# Patient Record
(demographics unavailable — no encounter records)

---

## 2025-03-12 NOTE — ASSESSMENT
[FreeTextEntry1] : # HS, severe, Hollis stage 3 with extensive scarring  #Suppurative acne on face vs #facial HS with extensive scarring, per pt worsened on cosentyx #high risk med mgmt Chronic, severe, flaring - Photos taken today for chart - has failed many prior medications as above in hx,including humira, cosentyx, isotretinoin, doxy/blake, prednisone - has had multiple HS surgeries in groin buttocks, axilla (most recently 2025) with good repsonse - Favor strating high dose infliximab (10 mg/kg week 0,2 and 6 followed by q 4 wks as maint) + upadicitinib. Would also consider cyclosporine for more acute control. Defers po pred today.  - May need genetic testing to r/o IL-1 mediated process (i.e. PASH), would also consider colonoscopy and/or skin biopsy to r/o CD in light of worsening on il-17i - check cytokine panel today - cbc, cmp, QG, hep serologies, lipid panel, g6PD (for dapsone) checked today  - r/b/a anti-TNF therapy discussed including but not limited to immunosuppression with increased risk of infection, decreased cell counts, elevated liver enzymes, worsening CHF and rash. - r/b/a MICHAEL inhibitors reviewed, discussed SE including but not limited to immunosuppression and increased risk of infection, malignancy, cytopenia, LFT elevation, lipid elevation, thrombotic events and CV events.    >45 min spent  RTC 1 week to potentially start rinvoq pending labs above

## 2025-03-12 NOTE — HISTORY OF PRESENT ILLNESS
[FreeTextEntry1] : f/u [de-identified] : Varinder Grant is a 39yoM presenting for mgmt of HS and acne  HiS HS in the groin and buttocks is quiet, he continues to drain in axilla, and the face and back are most problematic.  Acne and facial scarring has profoundly affected in mood and led to depression. He says because of it he became an alcoholic, although recently has been sober for 30 days and credits this to taking xanax twice a day. Has severe pain and persistent drainage. The only thing that has helped his HS has been surgery, and he has had multiple surgeries in the groin and buttcoks, and more recently in the last month in the right axilla.   Last saw Dr. Jacobs 9/2022. At that time noted to have Hollis stage 3 HS in axilla and groin as well as severe inflammatory acne on face with sinus tracts. Since then was started on cosentyx by outside derm and says acne on face "exploded." Recently on isotretinoin x 2 mos which did not help and stopped 2/2 daily alcohol intake.   Failed treatments: Doxy Isotretinoin, multiple courses most recently 10/2024-12/2024 Humira 2019/2020 8-9 mos Cosentyx ~Jan-June 2024 Oral and intralesional steroids  HISTORY:   - Has had bumps on body for 5-7 years, has had ?cystic facial acne since childhood, but noted marked worsening after cosnetyx - First saw a dermatologist 15y ago for facial acne--has been on doxycycline, isotretinoin, no response - prev w/ low platelets--had to be transfused - Denies any ulcers, joint pains - States he gets oxycodone, gabapentin from PCP to help him sleep

## 2025-03-12 NOTE — PHYSICAL EXAM
[Alert] : alert [Well Nourished] : well nourished [Conjunctiva Non-injected] : conjunctiva non-injected [FreeTextEntry3] : Cysts, nodules, and interconnected sinus tracts on face, axillae, groin, gluteal cleft Hypergranulation tissue, intergluteal cleft Purulent drainage of bilateral axillae, face Extensive scarring of bilateral axillae and groin and face Back and upper chest with diffusely scattered inflammatory papules/nodules/cysts and hyperpigmented macules/patches

## 2025-03-12 NOTE — HISTORY OF PRESENT ILLNESS
[FreeTextEntry1] : f/u [de-identified] : Varinder Grant is a 39yoM presenting for mgmt of HS and acne  HiS HS in the groin and buttocks is quiet, he continues to drain in axilla, and the face and back are most problematic.  Acne and facial scarring has profoundly affected in mood and led to depression. He says because of it he became an alcoholic, although recently has been sober for 30 days and credits this to taking xanax twice a day. Has severe pain and persistent drainage. The only thing that has helped his HS has been surgery, and he has had multiple surgeries in the groin and buttcoks, and more recently in the last month in the right axilla.   Last saw Dr. Jacobs 9/2022. At that time noted to have Hollis stage 3 HS in axilla and groin as well as severe inflammatory acne on face with sinus tracts. Since then was started on cosentyx by outside derm and says acne on face "exploded." Recently on isotretinoin x 2 mos which did not help and stopped 2/2 daily alcohol intake.   Failed treatments: Doxy Isotretinoin, multiple courses most recently 10/2024-12/2024 Humira 2019/2020 8-9 mos Cosentyx ~Jan-June 2024 Oral and intralesional steroids  HISTORY:   - Has had bumps on body for 5-7 years, has had ?cystic facial acne since childhood, but noted marked worsening after cosnetyx - First saw a dermatologist 15y ago for facial acne--has been on doxycycline, isotretinoin, no response - prev w/ low platelets--had to be transfused - Denies any ulcers, joint pains - States he gets oxycodone, gabapentin from PCP to help him sleep

## 2025-03-13 NOTE — HISTORY OF PRESENT ILLNESS
[FreeTextEntry1] : f/u [de-identified] : Varinder Grant is a 39yoM presenting for mgmt of HS and acne  HiS HS in the groin and buttocks is quiet, he continues to drain in axilla, and the face and back are most problematic.  Acne and facial scarring has profoundly affected in mood and led to depression. He says because of it he became an alcoholic, although recently has been sober for 30 days and credits this to taking xanax twice a day. Has severe pain and persistent drainage. The only thing that has helped his HS has been surgery, and he has had multiple surgeries in the groin and buttcoks, and more recently in the last month in the right axilla.   LV hx 3/5/25: Last saw Dr. Jacobs 9/2022. At that time noted to have Hollis stage 3 HS in axilla and groin as well as severe inflammatory acne on face with sinus tracts. Since then was started on cosentyx by outside derm and says acne on face "exploded." Recently on isotretinoin x 2 mos which did not help and stopped 2/2 daily alcohol intake.   Failed treatments: Doxy Isotretinoin, multiple courses most recently 10/2024-12/2024 Humira 2019/2020 8-9 mos Cosentyx ~Jan-June 2024 Oral and intralesional steroids  HISTORY:   - Has had bumps on body for 5-7 years, has had ?cystic facial acne since childhood, but noted marked worsening after cosnetyx - First saw a dermatologist 15y ago for facial acne--has been on doxycycline, isotretinoin, no response - prev w/ low platelets--had to be transfused - Denies any ulcers, joint pains - States he gets oxycodone, gabapentin from PCP to help him sleep

## 2025-03-13 NOTE — HISTORY OF PRESENT ILLNESS
[FreeTextEntry1] : f/u [de-identified] : Varinder Grant is a 39yoM presenting for mgmt of HS and acne  HiS HS in the groin and buttocks is quiet, he continues to drain in axilla, and the face and back are most problematic.  Acne and facial scarring has profoundly affected in mood and led to depression. He says because of it he became an alcoholic, although recently has been sober for 30 days and credits this to taking xanax twice a day. Has severe pain and persistent drainage. The only thing that has helped his HS has been surgery, and he has had multiple surgeries in the groin and buttcoks, and more recently in the last month in the right axilla.   LV hx 3/5/25: Last saw Dr. Jacobs 9/2022. At that time noted to have Hollis stage 3 HS in axilla and groin as well as severe inflammatory acne on face with sinus tracts. Since then was started on cosentyx by outside derm and says acne on face "exploded." Recently on isotretinoin x 2 mos which did not help and stopped 2/2 daily alcohol intake.   Failed treatments: Doxy Isotretinoin, multiple courses most recently 10/2024-12/2024 Humira 2019/2020 8-9 mos Cosentyx ~Jan-June 2024 Oral and intralesional steroids  HISTORY:   - Has had bumps on body for 5-7 years, has had ?cystic facial acne since childhood, but noted marked worsening after cosnetyx - First saw a dermatologist 15y ago for facial acne--has been on doxycycline, isotretinoin, no response - prev w/ low platelets--had to be transfused - Denies any ulcers, joint pains - States he gets oxycodone, gabapentin from PCP to help him sleep

## 2025-03-13 NOTE — ASSESSMENT
[FreeTextEntry1] : # HS, severe, Hollis stage 3 with extensive scarring  # Suppurative acne on face vs #facial HS with extensive scarring, per pt worsened on cosentyx # high risk med mgmt Chronic, severe, flaring - Photos taken 3/5/25 for chart - has failed many prior medications as above in hx, including humira, cosentyx, isotretinoin, doxy/blake, prednisone - has had multiple HS surgeries in groin buttocks, axilla (most recently 2025) with good repsonse - Favor starting high dose infliximab (10 mg/kg week 0,2 and 6 followed by q 4 wks as maint) + upadicitinib (samples of 15mg PO daily given to start today) SED. Defers po pred today.  - will order genetic testing to r/o IL-1 mediated process (i.e. PASH) - skin biopsy today to r/o CD in light of worsening on il-17i - check cytokine panel at - TNF- alpha WNL. IL-10, IL-6, IL-8, Interferon gamma are all elevated.  - reviewed labs from :  cbc with WBC 18.7, Hgb 12.9, . cmp with only mild elevation in glucose (106) total protein (8.9) and ALT slightly decreased (8) otherwise WNL.  QG negative. hep serologies - negative. lipid panel with mild high TG, LDL and Non-HDL and low HDL. total cholesterol is normal. g6PD (for dapsone) elevated at 25.7 - r/b/a anti-TNF therapy discussed including but not limited to immunosuppression with increased risk of infection, decreased cell counts, elevated liver enzymes, worsening CHF and rash. - r/b/a MICHAEL inhibitors reviewed, discussed SE including but not limited to immunosuppression and increased risk of infection, malignancy, cytopenia, LFT elevation, lipid elevation, thrombotic events and CV events.  - pt found hibiclens and BPO wash too drying, provided samples of CLn wash and dove sensitive skin today.   Neoplasm of uncertain behavior of the skin Location: chest DDx: r/o HS vs CD   Punch biopsy performed today over above location, risks and benefits discussed including incomplete removal, not enough tissue for diagnosis scarring and infection, informed consent obtained, pictures taken, cleaned with alcohol and anesthetized with 1%lido+epi,1cc total, lesion was biopsied with a 3mm punch, hemostasis obtained with nonabsorbable suture, vaseline and bandaid placed, tolerated well, wound care reviewed. I personally confirmed that the specimen was placed in the labeled vial, and I verbally confirmed this with patient in the room. The vial was closed and specimen sent to pathology.  >45 min spent  RTC 1 week after starting rinvoq today for fu and SR

## 2025-03-13 NOTE — HISTORY OF PRESENT ILLNESS
[FreeTextEntry1] : f/u [de-identified] : Varinder Grant is a 39yoM presenting for mgmt of HS and acne  HiS HS in the groin and buttocks is quiet, he continues to drain in axilla, and the face and back are most problematic.  Acne and facial scarring has profoundly affected in mood and led to depression. He says because of it he became an alcoholic, although recently has been sober for 30 days and credits this to taking xanax twice a day. Has severe pain and persistent drainage. The only thing that has helped his HS has been surgery, and he has had multiple surgeries in the groin and buttcoks, and more recently in the last month in the right axilla.   LV hx 3/5/25: Last saw Dr. Jacobs 9/2022. At that time noted to have Hollis stage 3 HS in axilla and groin as well as severe inflammatory acne on face with sinus tracts. Since then was started on cosentyx by outside derm and says acne on face "exploded." Recently on isotretinoin x 2 mos which did not help and stopped 2/2 daily alcohol intake.   Failed treatments: Doxy Isotretinoin, multiple courses most recently 10/2024-12/2024 Humira 2019/2020 8-9 mos Cosentyx ~Jan-June 2024 Oral and intralesional steroids  HISTORY:   - Has had bumps on body for 5-7 years, has had ?cystic facial acne since childhood, but noted marked worsening after cosnetyx - First saw a dermatologist 15y ago for facial acne--has been on doxycycline, isotretinoin, no response - prev w/ low platelets--had to be transfused - Denies any ulcers, joint pains - States he gets oxycodone, gabapentin from PCP to help him sleep

## 2025-03-23 NOTE — HISTORY OF PRESENT ILLNESS
[FreeTextEntry1] : f/u [de-identified] : Varinder Grant is a 39yoM presenting for mgmt of HS and acne  At  3/13/25 - had bx from the chest to rule out CD in light of worsening after prev on IL-17 inhibitor. Results still pending. Pt started Rinvoq 15mg daily with office samples at ,  has not noticed any improvement, continues to note drainage and pain from face and axillae with flare on back.  Hx HS in the groin and buttocks is quiet, he continues to drain in axilla, and the face and back are most problematic.  Acne and facial scarring has profoundly affected in mood and led to depression. He reports needing to take xanax twice a day to reduce his anxiety from this condition. Has severe pain and persistent drainage. The only thing that has helped his HS has been surgery, and he has had multiple surgeries in the groin and buttcoks, and more recently in the right axilla.   Pt previously started on cosentyx by outside derm and says that his acne on face "exploded." Recently on isotretinoin x 2 mos which did not help and stopped 2/2 daily alcohol intake.   Failed treatments: Doxy Isotretinoin, multiple courses most recently 10/2024-12/2024 Humira 2019/2020 8-9 mos Cosentyx ~Jan-June 2024 Oral and intralesional steroids  HISTORY:   - Has had bumps on body for 5-7 years, has had ?cystic facial acne since childhood, but noted marked worsening after cosnetyx - First saw a dermatologist 15y ago for facial acne--has been on doxycycline, isotretinoin, no response - prev w/ low platelets--had to be transfused - Denies any ulcers, joint pains - States he gets oxycodone, gabapentin from PCP to help him sleep

## 2025-03-23 NOTE — HISTORY OF PRESENT ILLNESS
[FreeTextEntry1] : f/u [de-identified] : Varinder Grant is a 39yoM presenting for mgmt of HS and acne  At  3/13/25 - had bx from the chest to rule out CD in light of worsening after prev on IL-17 inhibitor. Results still pending. Pt started Rinvoq 15mg daily with office samples at ,  has not noticed any improvement, continues to note drainage and pain from face and axillae with flare on back.  Hx HS in the groin and buttocks is quiet, he continues to drain in axilla, and the face and back are most problematic.  Acne and facial scarring has profoundly affected in mood and led to depression. He reports needing to take xanax twice a day to reduce his anxiety from this condition. Has severe pain and persistent drainage. The only thing that has helped his HS has been surgery, and he has had multiple surgeries in the groin and buttcoks, and more recently in the right axilla.   Pt previously started on cosentyx by outside derm and says that his acne on face "exploded." Recently on isotretinoin x 2 mos which did not help and stopped 2/2 daily alcohol intake.   Failed treatments: Doxy Isotretinoin, multiple courses most recently 10/2024-12/2024 Humira 2019/2020 8-9 mos Cosentyx ~Jan-June 2024 Oral and intralesional steroids  HISTORY:   - Has had bumps on body for 5-7 years, has had ?cystic facial acne since childhood, but noted marked worsening after cosnetyx - First saw a dermatologist 15y ago for facial acne--has been on doxycycline, isotretinoin, no response - prev w/ low platelets--had to be transfused - Denies any ulcers, joint pains - States he gets oxycodone, gabapentin from PCP to help him sleep

## 2025-03-23 NOTE — HISTORY OF PRESENT ILLNESS
[FreeTextEntry1] : f/u [de-identified] : Varinder Grant is a 39yoM presenting for mgmt of HS and acne  At  3/13/25 - had bx from the chest to rule out CD in light of worsening after prev on IL-17 inhibitor. Results still pending. Pt started Rinvoq 15mg daily with office samples at ,  has not noticed any improvement, continues to note drainage and pain from face and axillae with flare on back.  Hx HS in the groin and buttocks is quiet, he continues to drain in axilla, and the face and back are most problematic.  Acne and facial scarring has profoundly affected in mood and led to depression. He reports needing to take xanax twice a day to reduce his anxiety from this condition. Has severe pain and persistent drainage. The only thing that has helped his HS has been surgery, and he has had multiple surgeries in the groin and buttcoks, and more recently in the right axilla.   Pt previously started on cosentyx by outside derm and says that his acne on face "exploded." Recently on isotretinoin x 2 mos which did not help and stopped 2/2 daily alcohol intake.   Failed treatments: Doxy Isotretinoin, multiple courses most recently 10/2024-12/2024 Humira 2019/2020 8-9 mos Cosentyx ~Jan-June 2024 Oral and intralesional steroids  HISTORY:   - Has had bumps on body for 5-7 years, has had ?cystic facial acne since childhood, but noted marked worsening after cosnetyx - First saw a dermatologist 15y ago for facial acne--has been on doxycycline, isotretinoin, no response - prev w/ low platelets--had to be transfused - Denies any ulcers, joint pains - States he gets oxycodone, gabapentin from PCP to help him sleep

## 2025-03-23 NOTE — ASSESSMENT
[FreeTextEntry1] : # HS, severe, Hollis stage 3 with extensive scarring  # Suppurative acne on face vs #facial HS with extensive scarring, per pt worsened on cosentyx # High risk med mgmt Chronic, severe, flaring - Has failed many prior medications as above in hx, including humira, cosentyx, isotretinoin, doxy/blake, prednisone - Has had multiple HS surgeries in groin buttocks, axilla (most recently 2025) with good repsonse - started upadacitinib 15mg 3/13/25 - Checked cytokine panel at - TNF- alpha WNL. IL-10, IL-6, IL-8, Interferon gamma are all elevated.  - Reviewed labs from :  cbc with WBC 18.7, Hgb 12.9, . cmp with only mild elevation in glucose (106) total protein (8.9) and ALT slightly decreased (8) otherwise WNL.  QG negative. hep serologies - negative. lipid panel with mild high TG, LDL and Non-HDL and low HDL. total cholesterol is normal. g6PD (for dapsone) elevated at 25.7  PLAN:  - Photos taken today for chart  - Plan to start infliximab infusions starting at week 0,2 and 6 followed by q 4 wks as maint. Due to insurance denial of high starting dose (10 mg/kg) plan to start at a lower dose with increase as tolerated  - C/w upadicitinib 15mg PO daily, if labs below (CBC, CMP) are WNL, will INCREASE to 30mg daily. SED. Samples of 15mg PO daily given in office today. PLAN TO STOP ONCE INFLIXIMAB STARTED - Given significant flare today, espeically on back, will START prednisone 30mg daily x 2 weeks. SED  - Advised pt to STOP anxiolytic (eg. xanax) prior to bedtime - START gabapentin 300mg qhs (or prior to bedtime - pt notes she sleeps during the day). SED including drowsiness  - Will check CBC and CMP today, if WNL will increase Rinvoq dose per above  - Will f/u genetic testing to r/o IL-1 mediated process (i.e. PASH) - Will f/u skin biopsy from chest to r/o CD in light of worsening on il-17i. Sutures removed today, results pending  -- r/b/a anti-TNF therapy discussed including but not limited to immunosuppression with increased risk of infection, decreased cell counts, elevated liver enzymes, worsening CHF and rash. -- r/b/a MICHAEL inhibitors reviewed, discussed SE including but not limited to immunosuppression and increased risk of infection, malignancy, cytopenia, LFT elevation, lipid elevation, thrombotic events and CV events.  - pt found hibiclens and BPO wash too drying, previously provided samples of CLn wash and dove sensitive skin    RTC 2 weeks   >45 min

## 2025-04-01 NOTE — ASSESSMENT
[FreeTextEntry1] : # HS, severe, Hollis stage 3 with extensive scarring  # Suppurative acne on face vs #facial HS with extensive scarring, per pt worsened on cosentyx # High risk med mgmt Chronic, severe, flaring - Has failed many prior medications as above in hx, including humira, cosentyx, isotretinoin, doxy/blake, prednisone - Has had multiple HS surgeries in groin buttocks, axilla (most recently 2025) with good repsonse - started upadacitinib 15mg 3/13/25 - Checked cytokine panel - TNF- alpha WNL. IL-10, IL-6, IL-8, Interferon gamma are all elevated.  - Reviewed labs from :  cbc with WBC 18.7, Hgb 12.9, . cmp with only mild elevation in glucose (106) total protein (8.9) and ALT slightly decreased (8) otherwise WNL.  QG negative. hep serologies - negative. lipid panel with mild high TG, LDL and Non-HDL and low HDL. total cholesterol is normal. g6PD (for dapsone) elevated at 25.7  PLAN:  - Plan to start infliximab infusions starting at week 0,2 and 6 followed by q 4 wks as maint. Due to insurance denial of high starting dose (10 mg/kg) plan to start at a lower dose with increase as tolerated  - C/w upadicitinib 15mg PO daily, - Given improvement since LV, continue prednisone as below: --- 30mg/day x 2 weeks --> 20mg/day x 1 week --> 10mg/day x 1 week --> 5mg/day x 1 week. SED - Advised pt to STOP anxiolytic (eg. xanax) prior to bedtime, Pt prefers to continue and hold gabapentin. Recommended consultation with psychiatrist for med mgt - Repeat CBC and CMP today as well as Quant gold, T spot, and Hep B surface Ag - Will f/u genetic testing to r/o IL-1 mediated process (i.e. PASH) -- r/b/a anti-TNF therapy discussed including but not limited to immunosuppression with increased risk of infection, decreased cell counts, elevated liver enzymes, worsening CHF and rash. -- r/b/a MICHAEL inhibitors reviewed, discussed SE including but not limited to immunosuppression and increased risk of infection, malignancy, cytopenia, LFT elevation, lipid elevation, thrombotic events and CV events.  - pt found hibiclens and BPO wash too drying, previously provided samples of CLn wash and dove sensitive skin    RTC 2 weeks

## 2025-04-01 NOTE — HISTORY OF PRESENT ILLNESS
[FreeTextEntry1] : f/u [de-identified] : Varinder Grant is a 39yoM presenting for mgmt of HS and acne  LV 3/18; On 3/13/25 had bx from the chest to rule out CD in light of worsening after prev on IL-17 inhibitor. Results consistent with abscess with granulomatous inflammation, consistent with HS. Pt started Rinvoq 15mg daily with office samples and has subsequently increased to 30mg/day. Completed prednisone 30mg/day x 2 weeks since LV 3/18. Notes improvement in symptoms and severity of facial lesions. Has had increased appetite. Felt gabapentin was ineffective after 1 week. Restarted Xanax 1mg nightly. Does not follow with psychiatrist. Did not receive kit for genetic testing.   Hx: HS in the groin and buttocks is quiet, he continues to drain in axilla, and the face and back are most problematic.  Acne and facial scarring has profoundly affected in mood and led to depression. He reports needing to take xanax twice a day to reduce his anxiety from this condition. Has severe pain and persistent drainage. The only thing that has helped his HS has been surgery, and he has had multiple surgeries in the groin and buttcoks, and more recently in the right axilla.   Pt previously started on cosentyx by outside derm and says that his acne on face "exploded." Recently on isotretinoin x 2 mos which did not help and stopped 2/2 daily alcohol intake.   Failed treatments: Doxy Isotretinoin, multiple courses most recently 10/2024-12/2024 Humira 2019/2020 8-9 mos Cosentyx ~Jan-June 2024 Oral and intralesional steroids   - Has had bumps on body for 5-7 years, has had ?cystic facial acne since childhood, but noted marked worsening after cosnetyx - First saw a dermatologist 15y ago for facial acne--has been on doxycycline, isotretinoin, no response - prev w/ low platelets--had to be transfused - Denies any ulcers, joint pains - States he gets oxycodone, gabapentin from PCP to help him sleep

## 2025-04-01 NOTE — HISTORY OF PRESENT ILLNESS
[FreeTextEntry1] : f/u [de-identified] : Varinder Grant is a 39yoM presenting for mgmt of HS and acne  LV 3/18; On 3/13/25 had bx from the chest to rule out CD in light of worsening after prev on IL-17 inhibitor. Results consistent with abscess with granulomatous inflammation, consistent with HS. Pt started Rinvoq 15mg daily with office samples and has subsequently increased to 30mg/day. Completed prednisone 30mg/day x 2 weeks since LV 3/18. Notes improvement in symptoms and severity of facial lesions. Has had increased appetite. Felt gabapentin was ineffective after 1 week. Restarted Xanax 1mg nightly. Does not follow with psychiatrist. Did not receive kit for genetic testing.   Hx: HS in the groin and buttocks is quiet, he continues to drain in axilla, and the face and back are most problematic.  Acne and facial scarring has profoundly affected in mood and led to depression. He reports needing to take xanax twice a day to reduce his anxiety from this condition. Has severe pain and persistent drainage. The only thing that has helped his HS has been surgery, and he has had multiple surgeries in the groin and buttcoks, and more recently in the right axilla.   Pt previously started on cosentyx by outside derm and says that his acne on face "exploded." Recently on isotretinoin x 2 mos which did not help and stopped 2/2 daily alcohol intake.   Failed treatments: Doxy Isotretinoin, multiple courses most recently 10/2024-12/2024 Humira 2019/2020 8-9 mos Cosentyx ~Jan-June 2024 Oral and intralesional steroids   - Has had bumps on body for 5-7 years, has had ?cystic facial acne since childhood, but noted marked worsening after cosnetyx - First saw a dermatologist 15y ago for facial acne--has been on doxycycline, isotretinoin, no response - prev w/ low platelets--had to be transfused - Denies any ulcers, joint pains - States he gets oxycodone, gabapentin from PCP to help him sleep

## 2025-04-01 NOTE — PHYSICAL EXAM
[Alert] : alert [Well Nourished] : well nourished [Conjunctiva Non-injected] : conjunctiva non-injected [FreeTextEntry3] : Cysts, nodules, and interconnected sinus tracts on face, axillae, groin, gluteal cleft Hypergranulation tissue, intergluteal cleft Purulent drainage of bilateral axillae, face, improving Extensive scarring of bilateral axillae and groin and face Back and upper chest with hyperpigmented macules/patches

## 2025-04-27 NOTE — ASSESSMENT
[FreeTextEntry1] : # HS, severe, Hollis stage 3 with extensive scarring  # Suppurative acne on face vs #facial HS with extensive scarring, per pt worsened on cosentyx # High risk med mgmt Chronic, severe, flaring - Has failed many prior medications as above in hx, including humira, cosentyx, isotretinoin, doxy/blake, prednisone - Has had multiple HS surgeries in groin buttocks, axilla (most recently 2025) with good response - started upadacitinib 15mg 3/13/25, dose increased to 30mg 3/18/2025, decreased back to 15mg 4/1/2025 - On prednisone (starting dose 30mg) since 3/18/15, tapering down  - Checked cytokine panel - TNF- alpha WNL. IL-10, IL-6, IL-8, Interferon gamma are all elevated.  - Reviewed labs from :  cbc with WBC 18.7, Hgb 12.9, . cmp with only mild elevation in glucose (106) total protein (8.9) and ALT slightly decreased (8) otherwise WNL.  QG negative. hep serologies - negative. lipid panel with mild high TG, LDL and Non-HDL and low HDL. total cholesterol is normal. g6PD (for dapsone) elevated at 25.7  PLAN:  - Continue infliximab q4 weeks [Plan to start infliximab infusions starting at week 0,2 and 6 followed by q 4 wks as maint. Due to insurance denial of high starting dose (10 mg/kg) plan to start at a lower dose with increase as tolerated] - Continue upadicitinib 15mg PO daily, - Continue prednisone taper as prev prescribed: 30mg/day x 2 weeks --> 20mg/day x 1 week --> 10mg/day x 1 week --> 5mg/day x 1 week. SED - Advised pt to STOP anxiolytic (eg. xanax) prior to bedtime, Pt prefers to continue and hold gabapentin. Recommended consultation with psychiatrist for med mgt - Swab of pustule on back today, will FU and consider dapsone vs other abx (no G6PD deficiency) - Will f/u genetic testing to r/o IL-1 mediated process (i.e. PASH) -- r/b/a anti-TNF therapy discussed including but not limited to immunosuppression with increased risk of infection, decreased cell counts, elevated liver enzymes, worsening CHF and rash. -- r/b/a MICHAEL inhibitors reviewed, discussed SE including but not limited to immunosuppression and increased risk of infection, malignancy, cytopenia, LFT elevation, lipid elevation, thrombotic events and CV events.  - pt found hibiclens and BPO wash too drying, previously provided samples of CLn wash and dove sensitive skin    RTC 2 weeks   >45 min

## 2025-04-27 NOTE — HISTORY OF PRESENT ILLNESS
[FreeTextEntry1] : f/u [de-identified] : Varinder Grant is a 39yoM presenting for mgmt of HS and acne  LV4/1/25 - s/p infliximab infusion on 4/8, on Rinvoq 15mg, and prednisone 20mg (down from 30). Feels face has improved however has new inflamed and painful lesions on his chest and back.  3/13/25 bx from the chest to rule out CD in light of worsening after prev on IL-17 inhibitor. Results consistent with abscess with granulomatous inflammation, consistent with HS.   Hx: HS in the groin and buttocks is quiet, he continues to drain in axilla, and the face and back are most problematic.  Acne and facial scarring has profoundly affected in mood and led to depression. He reports needing to take xanax twice a day to reduce his anxiety from this condition. Has severe pain and persistent drainage. The only thing that has helped his HS has been surgery, and he has had multiple surgeries in the groin and buttocks, and more recently in the right axilla.   Pt previously started on cosentyx by outside derm and says that his acne on face "exploded." Recently on isotretinoin x 2 mos which did not help and stopped 2/2 daily alcohol intake.   Failed treatments: Doxy Isotretinoin, multiple courses most recently 10/2024-12/2024 Humira 2019/2020 8-9 mos Cosentyx ~Jan-June 2024 Oral and intralesional steroids   - Has had bumps on body for 5-7 years, has had ?cystic facial acne since childhood, but noted marked worsening after cosnetyx - First saw a dermatologist 15y ago for facial acne--has been on doxycycline, isotretinoin, no response - prev w/ low platelets--had to be transfused - Denies any ulcers, joint pains - States he gets oxycodone, gabapentin from PCP to help him sleep

## 2025-04-27 NOTE — HISTORY OF PRESENT ILLNESS
[FreeTextEntry1] : f/u [de-identified] : Varinder Grant is a 39yoM presenting for mgmt of HS and acne  LV4/1/25 - s/p infliximab infusion on 4/8, on Rinvoq 15mg, and prednisone 20mg (down from 30). Feels face has improved however has new inflamed and painful lesions on his chest and back.  3/13/25 bx from the chest to rule out CD in light of worsening after prev on IL-17 inhibitor. Results consistent with abscess with granulomatous inflammation, consistent with HS.   Hx: HS in the groin and buttocks is quiet, he continues to drain in axilla, and the face and back are most problematic.  Acne and facial scarring has profoundly affected in mood and led to depression. He reports needing to take xanax twice a day to reduce his anxiety from this condition. Has severe pain and persistent drainage. The only thing that has helped his HS has been surgery, and he has had multiple surgeries in the groin and buttocks, and more recently in the right axilla.   Pt previously started on cosentyx by outside derm and says that his acne on face "exploded." Recently on isotretinoin x 2 mos which did not help and stopped 2/2 daily alcohol intake.   Failed treatments: Doxy Isotretinoin, multiple courses most recently 10/2024-12/2024 Humira 2019/2020 8-9 mos Cosentyx ~Jan-June 2024 Oral and intralesional steroids   - Has had bumps on body for 5-7 years, has had ?cystic facial acne since childhood, but noted marked worsening after cosnetyx - First saw a dermatologist 15y ago for facial acne--has been on doxycycline, isotretinoin, no response - prev w/ low platelets--had to be transfused - Denies any ulcers, joint pains - States he gets oxycodone, gabapentin from PCP to help him sleep

## 2025-04-27 NOTE — HISTORY OF PRESENT ILLNESS
[FreeTextEntry1] : f/u [de-identified] : Varinder Grant is a 39yoM presenting for mgmt of HS and acne  LV4/1/25 - s/p infliximab infusion on 4/8, on Rinvoq 15mg, and prednisone 20mg (down from 30). Feels face has improved however has new inflamed and painful lesions on his chest and back.  3/13/25 bx from the chest to rule out CD in light of worsening after prev on IL-17 inhibitor. Results consistent with abscess with granulomatous inflammation, consistent with HS.   Hx: HS in the groin and buttocks is quiet, he continues to drain in axilla, and the face and back are most problematic.  Acne and facial scarring has profoundly affected in mood and led to depression. He reports needing to take xanax twice a day to reduce his anxiety from this condition. Has severe pain and persistent drainage. The only thing that has helped his HS has been surgery, and he has had multiple surgeries in the groin and buttocks, and more recently in the right axilla.   Pt previously started on cosentyx by outside derm and says that his acne on face "exploded." Recently on isotretinoin x 2 mos which did not help and stopped 2/2 daily alcohol intake.   Failed treatments: Doxy Isotretinoin, multiple courses most recently 10/2024-12/2024 Humira 2019/2020 8-9 mos Cosentyx ~Jan-June 2024 Oral and intralesional steroids   - Has had bumps on body for 5-7 years, has had ?cystic facial acne since childhood, but noted marked worsening after cosnetyx - First saw a dermatologist 15y ago for facial acne--has been on doxycycline, isotretinoin, no response - prev w/ low platelets--had to be transfused - Denies any ulcers, joint pains - States he gets oxycodone, gabapentin from PCP to help him sleep

## 2025-04-27 NOTE — PHYSICAL EXAM
[Alert] : alert [Well Nourished] : well nourished [Conjunctiva Non-injected] : conjunctiva non-injected [FreeTextEntry3] : Cysts, nodules, and interconnected sinus tracts on face, axillae, groin, gluteal cleft Extensive scarring of bilateral axillae and groin and face Back and upper chest with many red papules, pustules, and nodules, increased from prior examination

## 2025-04-28 NOTE — HISTORY OF PRESENT ILLNESS
[Denies] : Denies [No] : No [N/A] : N/A [Declined] : Declined [Informed consent documented in EHR.] : Informed consent documented in EHR. [Left upper extremity] : Left upper extremity [24g] : 24g [Start Time: ___] : Medication Start Time: [unfilled] [End Time: ___] : Medication End Time: [unfilled] [Medication Name: ___] : Medication Name: [unfilled] [Total Amount Administered: ___] : Total Amount Administered: [unfilled] [IV discontinued. Intact. No signs or symptoms of IV complications noted. Time: ___] : IV discontinued. Intact. No signs or symptoms of IV complications noted. Time: [unfilled] [Patient  instructed to seek medical attention with signs and symptoms of adverse effects] : Patient  instructed to seek medical attention with signs and symptoms of adverse effects [Patient left unit in no acute distress] : Patient left unit in no acute distress [Medications administered as ordered and tolerated well.] : Medications administered as ordered and tolerated well. [de-identified] : Median cubital vein  [de-identified] : Patient presents for week 2 of Inflectra infusion, doing well overall. Patient has a hx of hidradenitis suppurativa. Heartrate elevated upon arrival. Patient reports that he forgot to take his medication and does not have it with him currently. He takes propanolol; denies having arrythmias or other heart conditions. Patient reports having old lesions to the face. Patient reports face lesions have minimal drainage, itchiness, redness and occasional inflammation. Patient developed new "rash" on b/l arms and neck area s/p first infusion. Reports symptoms started 1 week after and that Dr. Bills is aware of it. Patient reports being on Prednisone 20 MG daily and Rinvoq 15MG pill last taken yesterday. No other symptoms or concerns noted at this time. Patient pre-medicated and toelrated infusion well.

## 2025-05-05 NOTE — HISTORY OF PRESENT ILLNESS
[FreeTextEntry1] : f/u [de-identified] : Varinder Grant is a 39yoM presenting for mgmt of HS and acne  s/p infliximab infusion on 4/8 and 4/28, on Rinvoq 15mg, and had last dose of his prednisone taper 3 days ago. Feels some improvement. notes intermittent bilateral knee pain that is not present today.   3/13/25 bx from the chest to rule out CD in light of worsening after prev on IL-17 inhibitor. Results consistent with abscess with granulomatous inflammation, consistent with HS.   Hx: HS in the groin and buttocks is quiet, he continues to drain in axilla, and the face and back are most problematic.  Acne and facial scarring has profoundly affected in mood and led to depression. He reports needing to take xanax twice a day to reduce his anxiety from this condition. Has severe pain and persistent drainage. The only thing that has helped his HS has been surgery, and he has had multiple surgeries in the groin and buttocks, and more recently in the right axilla.   Pt previously started on cosentyx by outside derm and says that his acne on face "exploded." Recently on isotretinoin x 2 mos which did not help and stopped 2/2 daily alcohol intake.   Failed treatments: Doxy Isotretinoin, multiple courses most recently 10/2024-12/2024 Humira 2019/2020 8-9 mos Cosentyx ~Jan-June 2024 Oral and intralesional steroids   - Has had bumps on body for 5-7 years, has had ?cystic facial acne since childhood, but noted marked worsening after cosnetyx - First saw a dermatologist 15y ago for facial acne--has been on doxycycline, isotretinoin, no response - prev w/ low platelets--had to be transfused - Denies any ulcers, joint pains - States he gets oxycodone, gabapentin from PCP to help him sleep

## 2025-05-05 NOTE — HISTORY OF PRESENT ILLNESS
[FreeTextEntry1] : f/u [de-identified] : Varinder Grant is a 39yoM presenting for mgmt of HS and acne  s/p infliximab infusion on 4/8 and 4/28, on Rinvoq 15mg, and had last dose of his prednisone taper 3 days ago. Feels some improvement. notes intermittent bilateral knee pain that is not present today.   3/13/25 bx from the chest to rule out CD in light of worsening after prev on IL-17 inhibitor. Results consistent with abscess with granulomatous inflammation, consistent with HS.   Hx: HS in the groin and buttocks is quiet, he continues to drain in axilla, and the face and back are most problematic.  Acne and facial scarring has profoundly affected in mood and led to depression. He reports needing to take xanax twice a day to reduce his anxiety from this condition. Has severe pain and persistent drainage. The only thing that has helped his HS has been surgery, and he has had multiple surgeries in the groin and buttocks, and more recently in the right axilla.   Pt previously started on cosentyx by outside derm and says that his acne on face "exploded." Recently on isotretinoin x 2 mos which did not help and stopped 2/2 daily alcohol intake.   Failed treatments: Doxy Isotretinoin, multiple courses most recently 10/2024-12/2024 Humira 2019/2020 8-9 mos Cosentyx ~Jan-June 2024 Oral and intralesional steroids   - Has had bumps on body for 5-7 years, has had ?cystic facial acne since childhood, but noted marked worsening after cosnetyx - First saw a dermatologist 15y ago for facial acne--has been on doxycycline, isotretinoin, no response - prev w/ low platelets--had to be transfused - Denies any ulcers, joint pains - States he gets oxycodone, gabapentin from PCP to help him sleep

## 2025-05-05 NOTE — ASSESSMENT
[FreeTextEntry1] : # HS, severe, Hollis stage 3 with extensive scarring  # Suppurative acne on face vs #facial HS with extensive scarring, per pt worsened on cosentyx # High risk med mgmt Chronic, severe, flaring - Has failed many prior medications as above in hx, including humira, cosentyx, isotretinoin, doxy/blake, prednisone - Has had multiple HS surgeries in groin buttocks, axilla (most recently 2025) with good response - started upadacitinib 15mg 3/13/25, dose increased to 30mg 3/18/2025, decreased back to 15mg 4/1/2025 - On prednisone (starting dose 30mg) since 3/18/25, tapered down and d/c  5/2/25 - Checked cytokine panel - TNF- alpha WNL. IL-10, IL-6, IL-8, Interferon gamma are all elevated.  - Reviewed labs during LV:  cbc with WBC 18.7, Hgb 12.9, . cmp with only mild elevation in glucose (106) total protein (8.9) and ALT slightly decreased (8) otherwise WNL.  QG negative. hep serologies - negative. lipid panel with mild high TG, LDL and Non-HDL and low HDL. total cholesterol is normal. g6PD (for dapsone) elevated at 25.7  PLAN:  - Continue infliximab q4 weeks [Plan to start infliximab infusions starting at week 0,2 and 6 followed by q 4 wks as maint. Due to insurance denial of high starting dose (10 mg/kg) plan to start at a lower dose with increase as tolerated] - Continue upadicitinib 15mg PO daily, - previously at  advised pt to STOP anxiolytic (eg. xanax) prior to bedtime, Pt prefers to continue and hold gabapentin. Recommended consultation with psychiatrist for med mgt - Swab of pustule on back at - Commensal kyler consistent with body site  -  genetic testing to r/o IL-1 mediated process (i.e. PASH): Results show variants of uncertain significance- CARD14 and RNF31 (scanned into chart) -- r/b/a anti-TNF therapy discussed including but not limited to immunosuppression with increased risk of infection, decreased cell counts, elevated liver enzymes, worsening CHF and rash. -- r/b/a MICHAEL inhibitors reviewed, discussed SE including but not limited to immunosuppression and increased risk of infection, malignancy, cytopenia, LFT elevation, lipid elevation, thrombotic events and CV events.  - pt found hibiclens and BPO wash too drying, previously provided samples of CLn wash and dove sensitive skin   - ok to continue triamcinolone 0.1% cream BID to affected areas for up to 2 weeks on/1 week OFF cycles. Repeat cycles as needed. SED.  - long term steroid use side effects such as skin atrophy, hypopigmentation and telangiectasis discussed - patient agrees to use topical steroids sparingly and as prescribed   RTC 6 weeks, sooner PRN   Principal Discharge DX:	Influenza   1

## 2025-05-05 NOTE — PHYSICAL EXAM
[Alert] : alert [Well Nourished] : well nourished [Conjunctiva Non-injected] : conjunctiva non-injected [FreeTextEntry3] : Cysts, nodules, and interconnected sinus tracts on face, axillae,  Extensive scarring of bilateral axillae and groin and face Back and upper chest with many red papules, pustules, and nodules - interval improvement since LV

## 2025-05-05 NOTE — ASSESSMENT
[FreeTextEntry1] : # HS, severe, Hollis stage 3 with extensive scarring  # Suppurative acne on face vs #facial HS with extensive scarring, per pt worsened on cosentyx # High risk med mgmt Chronic, severe, flaring - Has failed many prior medications as above in hx, including humira, cosentyx, isotretinoin, doxy/blake, prednisone - Has had multiple HS surgeries in groin buttocks, axilla (most recently 2025) with good response - started upadacitinib 15mg 3/13/25, dose increased to 30mg 3/18/2025, decreased back to 15mg 4/1/2025 - On prednisone (starting dose 30mg) since 3/18/25, tapered down and d/c  5/2/25 - Checked cytokine panel - TNF- alpha WNL. IL-10, IL-6, IL-8, Interferon gamma are all elevated.  - Reviewed labs during LV:  cbc with WBC 18.7, Hgb 12.9, . cmp with only mild elevation in glucose (106) total protein (8.9) and ALT slightly decreased (8) otherwise WNL.  QG negative. hep serologies - negative. lipid panel with mild high TG, LDL and Non-HDL and low HDL. total cholesterol is normal. g6PD (for dapsone) elevated at 25.7  PLAN:  - Continue infliximab q4 weeks [Plan to start infliximab infusions starting at week 0,2 and 6 followed by q 4 wks as maint. Due to insurance denial of high starting dose (10 mg/kg) plan to start at a lower dose with increase as tolerated] - Continue upadicitinib 15mg PO daily, - previously at  advised pt to STOP anxiolytic (eg. xanax) prior to bedtime, Pt prefers to continue and hold gabapentin. Recommended consultation with psychiatrist for med mgt - Swab of pustule on back at - Commensal kyler consistent with body site  -  genetic testing to r/o IL-1 mediated process (i.e. PASH): Results show variants of uncertain significance- CARD14 and RNF31 (scanned into chart) -- r/b/a anti-TNF therapy discussed including but not limited to immunosuppression with increased risk of infection, decreased cell counts, elevated liver enzymes, worsening CHF and rash. -- r/b/a MICHAEL inhibitors reviewed, discussed SE including but not limited to immunosuppression and increased risk of infection, malignancy, cytopenia, LFT elevation, lipid elevation, thrombotic events and CV events.  - pt found hibiclens and BPO wash too drying, previously provided samples of CLn wash and dove sensitive skin   - ok to continue triamcinolone 0.1% cream BID to affected areas for up to 2 weeks on/1 week OFF cycles. Repeat cycles as needed. SED.  - long term steroid use side effects such as skin atrophy, hypopigmentation and telangiectasis discussed - patient agrees to use topical steroids sparingly and as prescribed   RTC 6 weeks, sooner PRN

## 2025-05-28 NOTE — HISTORY OF PRESENT ILLNESS
[Denies] : Denies [No] : No [N/A] : N/A [Declined] : Declined [Informed consent documented in EHR.] : Informed consent documented in EHR. [Left upper extremity] : Left upper extremity [24g] : 24g [Start Time: ___] : Medication Start Time: [unfilled] [End Time: ___] : Medication End Time: [unfilled] [Medication Name: ___] : Medication Name: [unfilled] [Total Amount Administered: ___] : Total Amount Administered: [unfilled] [IV discontinued. Intact. No signs or symptoms of IV complications noted. Time: ___] : IV discontinued. Intact. No signs or symptoms of IV complications noted. Time: [unfilled] [Patient  instructed to seek medical attention with signs and symptoms of adverse effects] : Patient  instructed to seek medical attention with signs and symptoms of adverse effects [Patient left unit in no acute distress] : Patient left unit in no acute distress [Medications administered as ordered and tolerated well.] : Medications administered as ordered and tolerated well. [de-identified] : Median cubital vein  [de-identified] : Patient presents for week 6 of Inflectra infusion, doing well overall. Patient has a hx of hidradenitis suppurativa. Patient reports having old lesions to the face. Patient reports face lesions have minimal drainage, itchiness, redness and occasional inflammation. Patient reports lesions are getting better. Patient reports being on Rinvoq 15MG pill last taken yesterday. No other symptoms or concerns noted at this time. Patient pre-medicated and tolerated infusion well.

## 2025-06-24 NOTE — PHYSICAL EXAM
[Alert] : alert [Well Nourished] : well nourished [Conjunctiva Non-injected] : conjunctiva non-injected [FreeTextEntry3] : Cysts, nodules, and interconnected sinus tracts on face, axillae,  Extensive scarring of bilateral axillae and face Back and upper chest with many red papules, pustules, and nodules  draining inflammatory nodule in left posterior axilla

## 2025-06-24 NOTE — ASSESSMENT
[FreeTextEntry1] : # HS, severe, Hollis stage 3 with extensive scarring  # Suppurative acne on face vs #facial HS with extensive scarring, per pt worsened on cosentyx # High risk med mgmt Chronic, severe, flaring - Has failed many prior medications as above in hx, including humira, cosentyx, isotretinoin, doxy/blake, prednisone - Has had multiple HS surgeries in groin buttocks, axilla (most recently 2025) with good response - started upadacitinib 15mg 3/13/25, dose increased to 30mg 3/18/2025, decreased back to 15mg 4/1/2025 - started infliximab 10mg/kg infusion on 4/8, 4/28, 5/28 - Cytokine panel - TNF- alpha WNL. IL-10, IL-6, IL-8, Interferon gamma are all elevated.  -  genetic testing to r/o IL-1 mediated process (i.e. PASH): Results show variants of uncertain significance- CARD14 and RNF31 (scanned into chart) - Reviewed labs from April cbc with WBC 18.7--> 24.29, Hgb, PLTs stable.  cmp with only mild elevation in glucose (112) total protein (8.5) otherwise WNL.  QG negative. hep serologies - negative. lipid panel with mild high TG, LDL and Non-HDL and low HDL. total cholesterol is normal. g6PD (for dapsone) elevated at 25.7  PLAN:  - Continue infliximab q4 weeks [Plan to start infliximab infusions starting at week 0,2 and 6 followed by q 4 wks as maint. Due to insurance denial of high starting dose (10 mg/kg) plan to start at a lower dose with increase as tolerated] - INCREASE upadicitinib to 30mg PO daily, -- r/b/a anti-TNF therapy discussed including but not limited to immunosuppression with increased risk of infection, decreased cell counts, elevated liver enzymes, worsening CHF and rash. -- r/b/a MICHAEL inhibitors reviewed, discussed SE including but not limited to immunosuppression and increased risk of infection, malignancy, cytopenia, LFT elevation, lipid elevation, thrombotic events and CV events.  - pt found hibiclens and BPO wash too drying, previously provided samples of CLn wash and dove sensitive skin   - ok to continue triamcinolone 0.1% cream BID to affected areas for up to 2 weeks on/1 week OFF cycles. Repeat cycles as needed. SED.  - long term steroid use side effects such as skin atrophy, hypopigmentation and telangiectasis discussed - patient agrees to use topical steroids sparingly and as prescribed  **discussed MTX wouldve been a potential tx option however not in the setting of frequent alcohol use    #inflamed cyst Intralesional Steroid Injection Procedure Note Location: L axilla  The indication, risks, benefits, and alternatives to this procedure were discussed in detail with the patient and all questions were answered. Side effects including, but not limited to, the following were discussed: pain, infection, bleeding, skin atrophy, pigmentary changes, nerve damage and recurrence. Informed consent was obtained verbally. The affected areas were prepped with alcohol and injected with a total of approximately 0.6cc of 20 mg/cc of triamcinolone acetonide (Kenalog). The patient tolerated the procedure well without complications. Post-operative care was discussed. LOT: [id number], Exp: Apr 2026  RTC 6 weeks, sooner PRN  >45 min

## 2025-06-24 NOTE — HISTORY OF PRESENT ILLNESS
[FreeTextEntry1] : f/u [de-identified] : Varinder Grant is a 39yoM presenting for mgmt of HS and acne s/p infliximab infusion on 4/8, 4/28, 5/28 and on Rinvoq 15mg. Uses panoxyl 4% in the shower daily. Feels some improvement, no more draining from face however with some active areas in left axilla and back. Overall with significant depression and decreased quality of life. Drinks alcohol to cope. Has underwent numerous surgeries to axilla and groin to control HS. States it is doing well in both locations.   3/13/25 bx from the chest to rule out CD in light of worsening after prev on IL-17 inhibitor. Results consistent with abscess with granulomatous inflammation, consistent with HS.   Hx: HS in the groin and buttocks is quiet, he continues to drain in axilla, and the face and back are most problematic.  Acne and facial scarring has profoundly affected in mood and led to depression. He reports needing to take xanax twice a day to reduce his anxiety from this condition. Has severe pain and persistent drainage. The only thing that has helped his HS has been surgery, and he has had multiple surgeries in the groin and buttocks, and more recently in the right axilla.   Pt previously started on cosentyx by outside derm and says that his acne on face "exploded." Recently on isotretinoin x 2 mos which did not help and stopped 2/2 daily alcohol intake.   Failed treatments: Doxy Isotretinoin, multiple courses most recently 10/2024-12/2024 Humira 2019/2020 8-9 mos Cosentyx ~Jan-June 2024 Oral and intralesional steroids

## 2025-06-24 NOTE — HISTORY OF PRESENT ILLNESS
[FreeTextEntry1] : f/u [de-identified] : Varinder Grant is a 39yoM presenting for mgmt of HS and acne s/p infliximab infusion on 4/8, 4/28, 5/28 and on Rinvoq 15mg. Uses panoxyl 4% in the shower daily. Feels some improvement, no more draining from face however with some active areas in left axilla and back. Overall with significant depression and decreased quality of life. Drinks alcohol to cope. Has underwent numerous surgeries to axilla and groin to control HS. States it is doing well in both locations.   3/13/25 bx from the chest to rule out CD in light of worsening after prev on IL-17 inhibitor. Results consistent with abscess with granulomatous inflammation, consistent with HS.   Hx: HS in the groin and buttocks is quiet, he continues to drain in axilla, and the face and back are most problematic.  Acne and facial scarring has profoundly affected in mood and led to depression. He reports needing to take xanax twice a day to reduce his anxiety from this condition. Has severe pain and persistent drainage. The only thing that has helped his HS has been surgery, and he has had multiple surgeries in the groin and buttocks, and more recently in the right axilla.   Pt previously started on cosentyx by outside derm and says that his acne on face "exploded." Recently on isotretinoin x 2 mos which did not help and stopped 2/2 daily alcohol intake.   Failed treatments: Doxy Isotretinoin, multiple courses most recently 10/2024-12/2024 Humira 2019/2020 8-9 mos Cosentyx ~Jan-June 2024 Oral and intralesional steroids

## 2025-06-24 NOTE — PHYSICAL EXAM
[de-identified] : FEVER  [Alert] : alert [Well Nourished] : well nourished [Conjunctiva Non-injected] : conjunctiva non-injected [FreeTextEntry3] : Cysts, nodules, and interconnected sinus tracts on face, axillae,  Extensive scarring of bilateral axillae and face Back and upper chest with many red papules, pustules, and nodules  draining inflammatory nodule in left posterior axilla

## 2025-06-24 NOTE — ASSESSMENT
[FreeTextEntry1] : # HS, severe, Hollis stage 3 with extensive scarring  # Suppurative acne on face vs #facial HS with extensive scarring, per pt worsened on cosentyx # High risk med mgmt Chronic, severe, flaring - Has failed many prior medications as above in hx, including humira, cosentyx, isotretinoin, doxy/blake, prednisone - Has had multiple HS surgeries in groin buttocks, axilla (most recently 2025) with good response - started upadacitinib 15mg 3/13/25, dose increased to 30mg 3/18/2025, decreased back to 15mg 4/1/2025 - started infliximab 10mg/kg infusion on 4/8, 4/28, 5/28 - Cytokine panel - TNF- alpha WNL. IL-10, IL-6, IL-8, Interferon gamma are all elevated.  -  genetic testing to r/o IL-1 mediated process (i.e. PASH): Results show variants of uncertain significance- CARD14 and RNF31 (scanned into chart) - Reviewed labs from April cbc with WBC 18.7--> 24.29, Hgb, PLTs stable.  cmp with only mild elevation in glucose (112) total protein (8.5) otherwise WNL.  QG negative. hep serologies - negative. lipid panel with mild high TG, LDL and Non-HDL and low HDL. total cholesterol is normal. g6PD (for dapsone) elevated at 25.7  PLAN:  - Continue infliximab q4 weeks [Plan to start infliximab infusions starting at week 0,2 and 6 followed by q 4 wks as maint. Due to insurance denial of high starting dose (10 mg/kg) plan to start at a lower dose with increase as tolerated] - INCREASE upadicitinib to 30mg PO daily, -- r/b/a anti-TNF therapy discussed including but not limited to immunosuppression with increased risk of infection, decreased cell counts, elevated liver enzymes, worsening CHF and rash. -- r/b/a MICHAEL inhibitors reviewed, discussed SE including but not limited to immunosuppression and increased risk of infection, malignancy, cytopenia, LFT elevation, lipid elevation, thrombotic events and CV events.  - pt found hibiclens and BPO wash too drying, previously provided samples of CLn wash and dove sensitive skin   - ok to continue triamcinolone 0.1% cream BID to affected areas for up to 2 weeks on/1 week OFF cycles. Repeat cycles as needed. SED.  - long term steroid use side effects such as skin atrophy, hypopigmentation and telangiectasis discussed - patient agrees to use topical steroids sparingly and as prescribed  **discussed MTX wouldve been a potential tx option however not in the setting of frequent alcohol use    #inflamed cyst Intralesional Steroid Injection Procedure Note Location: L axilla  The indication, risks, benefits, and alternatives to this procedure were discussed in detail with the patient and all questions were answered. Side effects including, but not limited to, the following were discussed: pain, infection, bleeding, skin atrophy, pigmentary changes, nerve damage and recurrence. Informed consent was obtained verbally. The affected areas were prepped with alcohol and injected with a total of approximately 0.6cc of 20 mg/cc of triamcinolone acetonide (Kenalog). The patient tolerated the procedure well without complications. Post-operative care was discussed. LOT: [id number], Exp: Apr 2026  RTC 6 weeks, sooner PRN  >45 min

## 2025-06-25 NOTE — HISTORY OF PRESENT ILLNESS
[Denies] : Denies [No] : No [N/A] : N/A [Declined] : Declined [Informed consent documented in EHR.] : Informed consent documented in EHR. [Left upper extremity] : Left upper extremity [24g] : 24g [Start Time: ___] : Medication Start Time: [unfilled] [End Time: ___] : Medication End Time: [unfilled] [Medication Name: ___] : Medication Name: [unfilled] [Total Amount Administered: ___] : Total Amount Administered: [unfilled] [IV discontinued. Intact. No signs or symptoms of IV complications noted. Time: ___] : IV discontinued. Intact. No signs or symptoms of IV complications noted. Time: [unfilled] [Patient  instructed to seek medical attention with signs and symptoms of adverse effects] : Patient  instructed to seek medical attention with signs and symptoms of adverse effects [Patient left unit in no acute distress] : Patient left unit in no acute distress [Medications administered as ordered and tolerated well.] : Medications administered as ordered and tolerated well. [de-identified] : Median cubital vein  [de-identified] : Patient presents for scheduled dose Inflectra infusion, doing well overall. Patient has a hx of hidradenitis suppurativa. Patient reports having old lesions to the face, with noted improvement as per Dermatologist. Patient reports face lesions have minimal drainage, itchiness, redness and occasional inflammation. Patient reports lesions are getting better. Patient reports being on Rinvoq 30MG, increased as per MD. No other symptoms or concerns noted at this time. Patient pre-medicated and tolerated infusion well.

## 2025-07-23 NOTE — HISTORY OF PRESENT ILLNESS
[Denies] : Denies [No] : No [N/A] : N/A [Declined] : Declined [Informed consent documented in EHR.] : Informed consent documented in EHR. [Left upper extremity] : Left upper extremity [24g] : 24g [Start Time: ___] : Medication Start Time: [unfilled] [End Time: ___] : Medication End Time: [unfilled] [Medication Name: ___] : Medication Name: [unfilled] [Total Amount Administered: ___] : Total Amount Administered: [unfilled] [IV discontinued. Intact. No signs or symptoms of IV complications noted. Time: ___] : IV discontinued. Intact. No signs or symptoms of IV complications noted. Time: [unfilled] [Patient  instructed to seek medical attention with signs and symptoms of adverse effects] : Patient  instructed to seek medical attention with signs and symptoms of adverse effects [Patient left unit in no acute distress] : Patient left unit in no acute distress [Medications administered as ordered and tolerated well.] : Medications administered as ordered and tolerated well. [de-identified] : Median cubital vein  [de-identified] : Patient presents for Inflectra infusion, doing well overall. Patient has a hx of hidradenitis suppurativa. Patient reports having old lesions to the face. Patient reports face lesions have minimal drainage, itchiness, redness and occasional inflammation. Patient reports lesions are getting better. Does admit that he has a new lesion on the back, currently bleeding. Patient reports being on Rinvoq 30MG. No other symptoms or concerns noted at this time. Patient pre-medicated and tolerated infusion well.

## 2025-07-30 NOTE — HISTORY OF PRESENT ILLNESS
[FreeTextEntry1] : f/u [de-identified] : Varinder Grant is a 39yoM presenting for mgmt of HS and acne s/p infliximab infusion on 4/8, 4/28, 5/28 and monthly since then and on Rinvoq 30 mg now. Uses panoxyl 4% in the shower daily. Feels some improvement, no more draining from face however with some active areas in left axilla and back. Overall with significant depression and decreased quality of life. Drinks alcohol to cope. Has underwent numerous surgeries to axilla and groin to control HS. States it is doing well in both locations.  x1 tender papule that was draining blood yesterday on R lower back but otherwise feels well since LV  3/13/25 bx from the chest to rule out CD in light of worsening after prev on IL-17 inhibitor. Results consistent with abscess with granulomatous inflammation, consistent with HS.   Hx: HS in the groin and buttocks is quiet, he continues to drain in axilla, and the face and back are most problematic.  Acne and facial scarring has profoundly affected in mood and led to depression. He reports needing to take xanax twice a day to reduce his anxiety from this condition. Has severe pain and persistent drainage. The only thing that has helped his HS has been surgery, and he has had multiple surgeries in the groin and buttocks, and more recently in the right axilla.   Pt previously started on cosentyx by outside derm and says that his acne on face "exploded." Recently on isotretinoin x 2 mos which did not help and stopped 2/2 daily alcohol intake.   Failed treatments: Doxy Isotretinoin, multiple courses most recently 10/2024-12/2024 Humira 2019/2020 8-9 mos Cosentyx ~Jan-June 2024 Oral and intralesional steroids

## 2025-07-30 NOTE — PHYSICAL EXAM
[Alert] : alert [Well Nourished] : well nourished [Conjunctiva Non-injected] : conjunctiva non-injected [FreeTextEntry3] : Cysts, nodules, and interconnected sinus tracts on face, axillae,  Extensive scarring of bilateral axillae and face Back and upper chest with many red papules, pustules, and nodules  R lower with tender nodule, not actively draining, red, or warm

## 2025-07-30 NOTE — ASSESSMENT
[FreeTextEntry1] : # HS, severe, Hollis stage 3 with extensive scarring  # Suppurative acne on face vs #facial HS with extensive scarring, per pt worsened on cosentyx # High risk med mgmt Chronic, severe, flaring - Has failed many prior medications as above in hx, including humira, cosentyx, isotretinoin, doxy/blake, prednisone - Has had multiple HS surgeries in groin buttocks, axilla (most recently 2025) with good response - started upadacitinib 15mg 3/13/25, dose increased to 30mg 3/18/2025, decreased back to 15mg 4/1/2025, INC on 6/23/25 to 30 MG daily - started infliximab 10mg/kg infusion on 4/8, 4/28, 5/28 - Cytokine panel - TNF- alpha WNL. IL-10, IL-6, IL-8, Interferon gamma are all elevated.  -  genetic testing to r/o IL-1 mediated process (i.e. PASH): Results show variants of uncertain significance- CARD14 and RNF31 (scanned into chart)   PLAN:  - CONT infliximab 10mg/kg q4 weeks  - CONT upadicitinib to 30mg PO daily, -- r/b/a anti-TNF therapy discussed including but not limited to immunosuppression with increased risk of infection, decreased cell counts, elevated liver enzymes, worsening CHF and rash. -- r/b/a MICHAEL inhibitors reviewed, discussed SE including but not limited to immunosuppression and increased risk of infection, malignancy, cytopenia, LFT elevation, lipid elevation, thrombotic events and CV events.  - pt found hibiclens and BPO wash too drying, previously provided samples of CLn wash and dove sensitive skin   - ok to continue triamcinolone 0.1% cream BID to affected areas for up to 2 weeks on/1 week OFF cycles. Repeat cycles as needed. SED.  (pt doing 2 days on 2 days off) - long term steroid use side effects such as skin atrophy, hypopigmentation and telangiectasis discussed - patient agrees to use topical steroids sparingly and as prescribed  **discussed MTX wouldve been a potential tx option however not in the setting of frequent alcohol use   #high risk med use - CBC/CMP/lipid/CK today - Reviewed labs from June cbc and CMP are improved and stable. april labs: QG negative. hep serologies - negative. lipid panel with mild high TG, LDL and Non-HDL and low HDL. total cholesterol is normal. g6PD (for dapsone) elevated at 25.7   #inflamed cyst Intralesional Steroid Injection Procedure Note Location: R lower back x2  The indication, risks, benefits, and alternatives to this procedure were discussed in detail with the patient and all questions were answered. Side effects including, but not limited to, the following were discussed: pain, infection, bleeding, skin atrophy, pigmentary changes, nerve damage and recurrence. Informed consent was obtained verbally. The affected areas were prepped with alcohol and injected with a total of approximately 1.5cc of 10 mg/cc of triamcinolone acetonide (Kenalog). The patient tolerated the procedure well without complications. Post-operative care was discussed. LOT: [id number], Exp: 9/2027  RTC 2-3 months, sooner PRN

## 2025-07-30 NOTE — HISTORY OF PRESENT ILLNESS
[FreeTextEntry1] : f/u [de-identified] : Varinder Grant is a 39yoM presenting for mgmt of HS and acne s/p infliximab infusion on 4/8, 4/28, 5/28 and monthly since then and on Rinvoq 30 mg now. Uses panoxyl 4% in the shower daily. Feels some improvement, no more draining from face however with some active areas in left axilla and back. Overall with significant depression and decreased quality of life. Drinks alcohol to cope. Has underwent numerous surgeries to axilla and groin to control HS. States it is doing well in both locations.  x1 tender papule that was draining blood yesterday on R lower back but otherwise feels well since LV  3/13/25 bx from the chest to rule out CD in light of worsening after prev on IL-17 inhibitor. Results consistent with abscess with granulomatous inflammation, consistent with HS.   Hx: HS in the groin and buttocks is quiet, he continues to drain in axilla, and the face and back are most problematic.  Acne and facial scarring has profoundly affected in mood and led to depression. He reports needing to take xanax twice a day to reduce his anxiety from this condition. Has severe pain and persistent drainage. The only thing that has helped his HS has been surgery, and he has had multiple surgeries in the groin and buttocks, and more recently in the right axilla.   Pt previously started on cosentyx by outside derm and says that his acne on face "exploded." Recently on isotretinoin x 2 mos which did not help and stopped 2/2 daily alcohol intake.   Failed treatments: Doxy Isotretinoin, multiple courses most recently 10/2024-12/2024 Humira 2019/2020 8-9 mos Cosentyx ~Jan-June 2024 Oral and intralesional steroids

## 2025-07-30 NOTE — ASSESSMENT
[FreeTextEntry1] : # HS, severe, Hollis stage 3 with extensive scarring  # Suppurative acne on face vs #facial HS with extensive scarring, per pt worsened on cosentyx # High risk med mgmt Chronic, severe, flaring - Has failed many prior medications as above in hx, including humira, cosentyx, isotretinoin, doxy/blake, prednisone - Has had multiple HS surgeries in groin buttocks, axilla (most recently 2025) with good response - started upadacitinib 15mg 3/13/25, dose increased to 30mg 3/18/2025, decreased back to 15mg 4/1/2025, INC on 6/23/25 to 30 MG daily - started infliximab 10mg/kg infusion on 4/8, 4/28, 5/28 - Cytokine panel - TNF- alpha WNL. IL-10, IL-6, IL-8, Interferon gamma are all elevated.  -  genetic testing to r/o IL-1 mediated process (i.e. PASH): Results show variants of uncertain significance- CARD14 and RNF31 (scanned into chart)   PLAN:  - CONT infliximab 10mg/kg q4 weeks  - CONT upadicitinib to 30mg PO daily, -- r/b/a anti-TNF therapy discussed including but not limited to immunosuppression with increased risk of infection, decreased cell counts, elevated liver enzymes, worsening CHF and rash. -- r/b/a MICHAEL inhibitors reviewed, discussed SE including but not limited to immunosuppression and increased risk of infection, malignancy, cytopenia, LFT elevation, lipid elevation, thrombotic events and CV events.  - pt found hibiclens and BPO wash too drying, previously provided samples of CLn wash and dove sensitive skin   - ok to continue triamcinolone 0.1% cream BID to affected areas for up to 2 weeks on/1 week OFF cycles. Repeat cycles as needed. SED.  (pt doing 2 days on 2 days off) - long term steroid use side effects such as skin atrophy, hypopigmentation and telangiectasis discussed - patient agrees to use topical steroids sparingly and as prescribed  **discussed MTX wouldve been a potential tx option however not in the setting of frequent alcohol use   #high risk med use - CBC/CMP/lipid/CK today - Reviewed labs from June cbc and CMP are improved and stable. april labs: QG negative. hep serologies - negative. lipid panel with mild high TG, LDL and Non-HDL and low HDL. total cholesterol is normal. g6PD (for dapsone) elevated at 25.7   #inflamed cyst Intralesional Steroid Injection Procedure Note Location: R lower back x2  The indication, risks, benefits, and alternatives to this procedure were discussed in detail with the patient and all questions were answered. Side effects including, but not limited to, the following were discussed: pain, infection, bleeding, skin atrophy, pigmentary changes, nerve damage and recurrence. Informed consent was obtained verbally. The affected areas were prepped with alcohol and injected with a total of approximately 1.5cc of 10 mg/cc of triamcinolone acetonide (Kenalog). The patient tolerated the procedure well without complications. Post-operative care was discussed. LOT: [id number], Exp: 9/2027  RTC 2-3 months, sooner PRN